# Patient Record
(demographics unavailable — no encounter records)

---

## 2025-03-11 NOTE — ADDENDUM
[FreeTextEntry1] :  blood will be drawn in the office today  This note was written by Cristofer Leon on 03/07/2025 acting as medical scribe for Dr. Armando Francois. I, Dr. Armando Francois, have read and attest that all the information, medical decision making and discharge instructions within are true and accurate.

## 2025-03-11 NOTE — HISTORY OF PRESENT ILLNESS
[FreeTextEntry1] : Ms. BRITTON is a 58-year-old female who returns with regard to a history of thyroid nodules and osteopenia.   She has been having thyroid US for last 5 years with no biopsy needed.  Thyroid US Sept 2023 Nodule 1: Mid right nodule 1.1 x 0.6 x 0.9 TR3 Nodule 2: right lower pole 1.0 x 0.6x 0.7 cm TR4 Nodule 3: right anterior lower pole 1.1 x 0.6 x 1.0 cm TR4 Nodule 4: left lower pole 0.8 x 0.5 x 0.7 cm TR3  *** Latest Thyroid US from 09/04/2024 showed stable scattered benign appearing spongiform TR-1 nodules bilaterally.  Denies anterior neck discomfort, difficulty swallowing, or any change in the appearance of the neck region.  ____________________________________________________________________________________________________  Regarding the Osteoporosis, last DEXA was done in 09/2023 at [facility] with T-scores: Spine L3-L4: -0.8 L FN: -1.1 TH: -1.2 FRAX ---> non-hip = 2.8%, and 0.1%  Menopause around 48  Patient denies bony adult fractures, malabsorption disorders such as IBD or celiac's, prolonged steroid use, prolonged immobilization, use of PPI's, history of hyperthyroidism. Denies hypercalcemia, kidney stones or primary hyperparathyroidism.  Prior did have calcium elevation 10.6 in Feb 2024 however on repeat with workup levels returned WNL with PTH 32  ____________________________________________________________________________________________________ Additional medical history includes that of asthma Medications: inhaler PRN, MV, calcium (with 1000 IU D3 + vitamin K), vitamin D3 of unsure dose   PCP: Dr. Sands

## 2025-03-11 NOTE — HISTORY OF PRESENT ILLNESS
[FreeTextEntry1] : Ms. BRITTON is a 58-year-old female who returns with regard to a history of thyroid nodules and osteopenia.   She has been having thyroid US for last 5 years with no biopsy needed.  Thyroid US Sept 2023 Nodule 1: Mid right nodule 1.1 x 0.6 x 0.9 TR3 Nodule 2: right lower pole 1.0 x 0.6x 0.7 cm TR4 Nodule 3: right anterior lower pole 1.1 x 0.6 x 1.0 cm TR4 Nodule 4: left lower pole 0.8 x 0.5 x 0.7 cm TR3  *** Latest Thyroid US from 09/04/2024 showed stable scattered benign appearing spongiform TR-1 nodules bilaterally.  Denies anterior neck discomfort, difficulty swallowing, or any change in the appearance of the neck region.  ____________________________________________________________________________________________________  Regarding the Osteoporosis, last DEXA was done in 09/2023 at [facility] with T-scores: Spine L3-L4: -0.8 L FN: -1.1 TH: -1.2 FRAX ---> non-hip = 2.8%, and 0.1%  Menopause around 48  Patient denies bony adult fractures, malabsorption disorders such as IBD or celiac's, prolonged steroid use, prolonged immobilization, use of PPI's, history of hyperthyroidism. Denies hypercalcemia, kidney stones or primary hyperparathyroidism.  Prior did have calcium elevation 10.6 in Feb 2024 however on repeat with workup levels returned WNL with PTH 32  ____________________________________________________________________________________________________ Additional medical history includes that of asthma Medications: inhaler PRN, MV, calcium (with 1000 IU D3 + vitamin K), vitamin D3 of unsure dose   PCP: Dr. Sands